# Patient Record
Sex: MALE | Race: WHITE | NOT HISPANIC OR LATINO | Employment: OTHER | URBAN - METROPOLITAN AREA
[De-identification: names, ages, dates, MRNs, and addresses within clinical notes are randomized per-mention and may not be internally consistent; named-entity substitution may affect disease eponyms.]

---

## 2019-08-27 PROBLEM — Z79.01 LONG TERM (CURRENT) USE OF ANTICOAGULANTS: Status: ACTIVE | Noted: 2019-08-27

## 2019-08-27 PROBLEM — I82.90 THROMBUS: Status: ACTIVE | Noted: 2019-08-27

## 2021-05-10 ENCOUNTER — PATIENT MESSAGE (OUTPATIENT)
Dept: RESEARCH | Facility: HOSPITAL | Age: 86
End: 2021-05-10

## 2021-06-24 PROBLEM — M79.662 PAIN OF LEFT CALF: Status: ACTIVE | Noted: 2021-06-24

## 2021-06-24 PROBLEM — Z71.89 ACP (ADVANCE CARE PLANNING): Status: ACTIVE | Noted: 2021-06-24

## 2021-06-24 PROBLEM — N17.9 AKI (ACUTE KIDNEY INJURY): Status: ACTIVE | Noted: 2021-06-24

## 2021-06-24 PROBLEM — G93.41 ACUTE METABOLIC ENCEPHALOPATHY: Status: ACTIVE | Noted: 2021-06-24

## 2021-06-24 PROBLEM — I82.4Y2 ACUTE DEEP VEIN THROMBOSIS (DVT) OF PROXIMAL VEIN OF LEFT LOWER EXTREMITY: Status: ACTIVE | Noted: 2021-06-24

## 2021-06-24 PROBLEM — N30.00 ACUTE CYSTITIS WITHOUT HEMATURIA: Status: ACTIVE | Noted: 2021-06-24

## 2021-06-26 PROBLEM — Z71.89 ACP (ADVANCE CARE PLANNING): Status: RESOLVED | Noted: 2021-06-24 | Resolved: 2021-06-26

## 2021-06-29 PROBLEM — M25.552 LEFT HIP PAIN: Status: ACTIVE | Noted: 2021-06-29

## 2021-06-29 PROBLEM — K59.00 CONSTIPATION: Status: ACTIVE | Noted: 2021-06-29

## 2021-06-29 PROBLEM — M25.522 LEFT ELBOW PAIN: Status: ACTIVE | Noted: 2021-06-29

## 2021-07-01 ENCOUNTER — PATIENT MESSAGE (OUTPATIENT)
Dept: ADMINISTRATIVE | Facility: OTHER | Age: 86
End: 2021-07-01

## 2021-10-07 PROBLEM — N39.0 URINARY TRACT INFECTION WITHOUT HEMATURIA: Status: ACTIVE | Noted: 2021-10-07

## 2021-10-24 PROBLEM — A41.9 SEPSIS: Status: ACTIVE | Noted: 2021-10-24

## 2021-10-24 PROBLEM — R65.20 SEVERE SEPSIS: Status: ACTIVE | Noted: 2021-10-24

## 2021-10-24 PROBLEM — A41.9 SEVERE SEPSIS: Status: RESOLVED | Noted: 2021-10-24 | Resolved: 2021-10-24

## 2021-10-24 PROBLEM — N39.0 URINARY TRACT INFECTION WITHOUT HEMATURIA: Status: RESOLVED | Noted: 2021-10-07 | Resolved: 2021-10-24

## 2021-10-24 PROBLEM — A41.9 SEVERE SEPSIS: Status: ACTIVE | Noted: 2021-10-24

## 2021-10-24 PROBLEM — R65.20 SEVERE SEPSIS: Status: RESOLVED | Noted: 2021-10-24 | Resolved: 2021-10-24

## 2021-10-24 PROBLEM — A41.9 SEPSIS: Status: RESOLVED | Noted: 2021-10-24 | Resolved: 2021-10-24

## 2022-04-13 ENCOUNTER — DOCUMENTATION ONLY (OUTPATIENT)
Dept: ADMINISTRATIVE | Facility: OTHER | Age: 87
End: 2022-04-13
Payer: MEDICARE

## 2022-04-19 NOTE — PROGRESS NOTES
"RADIATION ONCOLOGY CONSULTATION  [facility]        NAME: Errol Broderick    : 1931 SEX: M MR#: [id number]   DIAGNOSIS: Gastroesophageal junction adenocarcinoma.   REFERRING PHYSICIAN: Jamal Eddy M.D.   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  The patient is being seen in consultation for consideration of radiotherapy at the request of Dr. Eddy.    HISTORY OF PRESENT ILLNESS:  The patient was admitted to [facility] from 10/07/2021 to 10/25/2021, with confusion secondary to urosepsis for which the patient was treated with antibiotics and IV fluids.  CT of the head without contrast on the date of admission showed stable global volume loss with no evidence of acute disease.  Abdominal ultrasound on 10/09/2021, showed incidental right-sided pleural fluid. The patient was subsequently discharged to a skilled nursing facility in [place] and after being discharged from that facility entered hospice care in November due to his heart failure.     The patient is unable to provide a clear history of when he first began to develop symptoms; however, his family reports that approximately two months ago they noted that he was having difficulty swallowing solids which became progressively worse.  He reports occasional discomfort when swallowing, as well as associated weight loss.  The patient had an episode of "spitting up blood" at which time they saw the patient's primary care physician, Dr. Michelle.     On 2022, Dr. Michelle performed an EGD, biopsy and esophageal dilatation.  The procedure notes states that, "Esophagus did have some erythema at the cardioesophageal (SIC) junction and also the mass effect which was very narrow esophagus suggestive of an " "inflammatory mass or possible cancer." There is no mention made of the distance of the tumor from the verge, the tumor length, degree of circumferential involvement, ulceration or bleeding on the report.  Biopsy demonstrated nonspecific chronic inflammation without evidence of malignancy or dysplasia which was H pylori negative.  GE junction biopsy was positive for poorly differentiated adenocarcinoma with a background of ulceration.     He saw Dr. Michelle again on 03/31/2022, who referred the patient to Oncology.  He was seen by Dr. Eddy on 04/06/2022, who recommended blood work, PET-CT scan and referral to Radiation Oncology.  The patient is scheduled for a PET scan on 04/19/2022.    REVIEW OF SYSTEMS:  The patient's primary complaint is progressive dysphagia initially to solids, however, with difficulty to liquids over the past two weeks, episodes of regurgitated liquids over the past three to four days.  He does note occasional discomfort while swallowing, but denies any rochelle pain.  He does report epigastric abdominal pain.  Denies any fevers, chills or drenching night sweats.  There is an approximate 50-pound unintentional weight loss over the past four months.  He otherwise complains of significant fatigue, chronic decreased hearing, chronic dyspnea on exertion, occasional constipation, intermittently decreased balance.     He denies any recent change in vision or hearing, shortness of breath at rest, chest pain, hemoptysis, nausea, vomiting, diarrhea or bright-red blood per rectum.  He denies any focal numbness, tingling, weakness, severe headaches or diplopia.  All other systems reviewed and negative.    PAST MEDICAL HISTORY:  Gastroesophageal adenocarcinoma as above, atrial fibrillation, diabetes mellitus, hypertension, coronary artery disease, history of DVT.    PAST SURGICAL HISTORY:  Status post coronary stenting times three vessels, status post cholecystectomy, status post bilateral knee replacement, " status post abdominal hernia repair, status post right shoulder surgery.    PAST RADIATION THERAPY:  None.    MEDICATIONS:  Melatonin, Flomax, Trazodone, amlodipine, isosorbide, metoprolol, atorvastatin, Eliquis, aspirin.    ALLERGIES:  Morphine causes mental status changes.    FAMILY HISTORY:  Two first cousins with unknown malignancy.    SOCIAL HISTORY:  The patient has a 100- to 150-pack-year history of smoking cigarettes; he reports having cut down to less than one-half pack per day.  He drank alcohol socially in the past, however, no longer does so and denies illicit drug use.  He lives in [place], is  and has three children.  In the past, he worked for the [employer].    PHYSICAL EXAMINATION:  VITAL SIGNS:  Blood pressure 81/46, temperature 97.5, heart rate 69, weight 195 pounds, height 5 feet 9 inches, oxygen saturation 97% on room air.  ECOG score 2-3  GENERAL:  The patient is an elderly frail-appearing  male sitting comfortably in a wheelchair.   HEENT:  Normocephalic, atraumatic.  Extraocular muscles intact. Pupils equally round and reactive to light.  Sclerae anicteric.  Oral cavity and oropharynx are clear without erythema, exudate, masses or ulcerations.  The patient has no right-sided hearing with decreased left-sided hearing, with a hearing aid in place.   NECK:  Supple without lymphadenopathy or thyromegaly.   HEART:  Regular rate and rhythm.  Normal S1, S2.   LUNGS:  Distant breath sounds bilaterally.  No wheezes, rhonchi or rales.   BACK: No spinal or costovertebral angle tenderness.   ABDOMEN:  He is moderately tender to palpation in the epigastrium without rebounding or guarding.  A right upper quadrant cholecystectomy scar and midline abdominal hernia scar.  No hepatosplenomegaly.   EXTREMITIES:  Warm, well perfused.  No clubbing, cyanosis or edema.   NEUROLOGIC:  Cranial nerves two through 12 grossly intact with the exception of no right-sided hearing and  decreased left-sided hearing as noted previously.  Motor 5/5 bilaterally.  Sensation intact to light touch and pressure bilaterally.  Finger-nose-finger without dysmetria.  Gait not assessed.  1- patellar deep tendon reflexes bilaterally.    LABORATORY:  None available.    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     Based on this patient's history and presentation, I believe he is likely an appropriate candidate for external beam radiation therapy.  My recommendation is for completion of staging, including a PET scan which has been ordered by Dr. Eddy, and to which I will add diagnostic CT scans of the chest and abdomen.  I plan to the obtain the imaging in the radiation treatment position so that it might be utilized, not only for diagnostic purposes, but also for anticipated tumor targeting during radiation treatment planning.     I explained to the patient and his family that in the setting of disease confined to the GE junction and regional node that typically definitive therapy would be considered consisting generally of concurrent chemoradiation and possibly followed by a surgical resection.  In Mr. Broderick's particular case, however, given his age and overall poor performance status, I do not think that he is likely to tolerate definitive therapy, even if there is no evidence of distant metastatic disease.     Whether the staging imaging demonstrates disease to be localized, or there is evidence of stage IV disease, the mainstay of his future therapy would likely be palliative systemic therapy if he is felt to be a candidate for such, depending on Dr. Eddy's evaluation.  A primary concern is the patient's progressive dysphagia initially to solids, but now to liquids with associated weight loss.  My recommendation would  be for a short course of palliative external beam radiation therapy alone to the primary tumor alone as there is a very high likelihood that this will help alleviate the patient's obstruction, improve dysphagia, decrease the risk of future bleeding, as well as continued weight loss.  I do feel that Mr. Broderick would be likely to tolerate the short course of palliative radiation, and made clear to the family that the purpose of treatment would be solely to improve the patient's quality of life and likely have no bearing on his overall survival.     I am quite concerned that Mr. Broderick appears to be approaching the point of near complete obstruction, no longer able to tolerate solid, increasing difficulty with liquids.  I explained to the patient and his family that external beam radiation therapy is very effective at alleviating obstruction; however, it will take some time to take effect and I do not feel that he would be able to wait until such time, and will need additional hydration and nutrition in the meantime.  I will contact Dr. Eddy in order to give the patient additional IV fluids.  I do think that the patient will require PEG tube placement, likely surgical due to what I expect to be near complete obstruction, which I plan to discuss with Dr. Michelle.  The patient understands that the G-tube would be placed temporarily until which time he is able to regain his swallowing function, and adequately maintain his hydration and nutrition.     The risks, benefits, side effects, alternatives to, indications for and mechanisms of external beam radiation therapy were explained in full to the patient.  He and his family accompanying him at today's visit asked multiple appropriate questions, all of which were answered to their apparent satisfaction.  This conversation included discussion of possible short-term side effects, including but not limited to dermatitis, local alopecia, dysphagia, odynophagia, nausea, vomiting,  abdominal cramping and fatigue.  We also discussed possible long-term side effects, including but not limited to residual hyperpigmentation and/or alopecia, esophageal and/or gastric ulceration, low risk bowel obstruction, risk of damage to the liver, kidneys, nerves and/or spinal cord.  He agreed with the proposed treatment plan and informed consent was obtained.     Mr. Broderick has been scheduled to undergo PET CT-guided simulation in the supine treatment position with the use of a wing board and Vac-Katy for custom immobilization this coming Tuesday, 04/19/2022.  In the meantime, I plan to discuss his case with Dr. Eddy and Dr. Michelle in order to arrange for additional IV fluid hydration, as well as PEG tube placement.  After the imaging has been completed, radiation treatment planning will ensue after which he will return to [facility] to undergo verification films and to initiate an anticipated two and half to three week course of palliative intensity modulated radiotherapy therapy alone.     Thank you very much, Dr. Eddy, for this consultation and opportunity to participate in the care of this patient.  Please do not hesitate to contact me should you have any questions, concerns and/or require additional information.      ADDENDUM #1:  I have discussed Mr. Broderick's case with Dr. Eddy who has kindly agreed to arrange for additional IV fluid hydration immediately following the consultation, as well as tomorrow prior to the holiday weekend.    ADDENDUM #2:  I have discussed Mr. Broderick's case with Dr. Michelle who has agreed to arrange for outpatient IV fluids over the weekend and also plans to place a PEG tube early next week, and the patient and family were instructed to discontinue anticoagulation in preparation for the procedure.        VAL Caro MD